# Patient Record
Sex: MALE | URBAN - METROPOLITAN AREA
[De-identification: names, ages, dates, MRNs, and addresses within clinical notes are randomized per-mention and may not be internally consistent; named-entity substitution may affect disease eponyms.]

---

## 2024-06-13 ENCOUNTER — APPOINTMENT (RX ONLY)
Dept: URBAN - METROPOLITAN AREA CLINIC 31 | Facility: CLINIC | Age: 24
Setting detail: DERMATOLOGY
End: 2024-06-13

## 2024-06-13 PROBLEM — L74.8 OTHER ECCRINE SWEAT DISORDERS: Status: ACTIVE | Noted: 2024-06-13

## 2024-06-13 PROCEDURE — ? COUNSELING

## 2024-06-13 PROCEDURE — 99202 OFFICE O/P NEW SF 15 MIN: CPT

## 2024-06-13 PROCEDURE — ? ADDITIONAL NOTES

## 2024-06-13 PROCEDURE — ? DIAGNOSIS COMMENT

## 2024-06-13 NOTE — PROCEDURE: ADDITIONAL NOTES
Additional Notes: Consulted with Dr Payne, discussed exam findings including pt report of excessive sweating on the nose, and with report of episodic rapid heart rate, and hx of hypertension, and that these findings may be concerning for pheochromocytoma. Dr Payne agrees with this concern and recommends referring to pcp for further eval and management. Pt through  states he does not have pcp. Therefore, will place [facility]. As well, counseled pt on drysol use, provided pt with drysol rx today. Discussed with pt pheochromocytoma is typically a noncancerous tumor of the adrenal gland and tumor resection will lead to symptom resolution
Detail Level: Detailed
Render Risk Assessment In Note?: no

## 2024-06-13 NOTE — PROCEDURE: COUNSELING
normal...
Medical Necessity Clause: Botulinum toxin hyperhidrosis therapy is medically necessary because
Detail Level: Zone
Medical Necessity Information: LCD Guidelines vary from payer to payer. Please check with your payer's policy to determine medical necessity.